# Patient Record
Sex: MALE
[De-identification: names, ages, dates, MRNs, and addresses within clinical notes are randomized per-mention and may not be internally consistent; named-entity substitution may affect disease eponyms.]

---

## 2018-07-12 ENCOUNTER — IMPORTED ENCOUNTER (OUTPATIENT)
Age: 73
End: 2018-07-12

## 2020-07-25 ENCOUNTER — TELEPHONE ENCOUNTER (OUTPATIENT)
Dept: URBAN - METROPOLITAN AREA CLINIC 13 | Facility: CLINIC | Age: 75
End: 2020-07-25

## 2020-07-25 RX ORDER — POLYETHYLENE GLYCOL 3350, SODIUM SULFATE, SODIUM CHLORIDE, POTASSIUM CHLORIDE, ASCORBIC ACID, SODIUM ASCORBATE 7.5-2.691G
USE AS DIRECTED KIT ORAL
Qty: 1 | Refills: 0 | OUTPATIENT
Start: 2013-02-06 | End: 2013-03-28

## 2020-07-25 RX ORDER — CETIRIZINE HYDROCHLORIDE 10 MG/1
TAKE 1 TABLET DAILY.  10 MG TABLET, FILM COATED ORAL
Refills: 0 | OUTPATIENT
End: 2016-01-22

## 2020-07-25 RX ORDER — GABAPENTIN 300 MG/1
TAKE 1 CAPSULE DAILY CAPSULE ORAL
Refills: 0 | OUTPATIENT
End: 2016-04-14

## 2020-07-26 ENCOUNTER — TELEPHONE ENCOUNTER (OUTPATIENT)
Dept: URBAN - METROPOLITAN AREA CLINIC 13 | Facility: CLINIC | Age: 75
End: 2020-07-26

## 2020-07-26 RX ORDER — KETOCONAZOLE 20 MG/G
CREAM TOPICAL
Qty: 30 | Refills: 0 | Status: ACTIVE | COMMUNITY
Start: 2012-04-17

## 2020-07-26 RX ORDER — MELOXICAM 7.5 MG/1
TABLET ORAL
Qty: 50 | Refills: 0 | Status: ACTIVE | COMMUNITY
Start: 2012-11-01

## 2020-07-26 RX ORDER — GLIPIZIDE 5 MG/1
TABLET, EXTENDED RELEASE ORAL
Qty: 90 | Refills: 0 | Status: ACTIVE | COMMUNITY
Start: 2012-04-10

## 2020-07-26 RX ORDER — INSULIN GLARGINE 100 [IU]/ML
INJECT SUBCUTANEOUSLY AS DIRECTED. 24 UNITS INJECTION, SOLUTION SUBCUTANEOUS
Refills: 0 | Status: ACTIVE | COMMUNITY

## 2020-07-26 RX ORDER — PREDNISONE 10 MG/1
TABLET ORAL
Qty: 24 | Refills: 0 | Status: ACTIVE | COMMUNITY
Start: 2012-04-17

## 2020-07-26 RX ORDER — HYDROCODONE BITARTRATE AND ACETAMINOPHEN 5; 325 MG/1; MG/1
TABLET ORAL
Qty: 12 | Refills: 0 | Status: ACTIVE | COMMUNITY
Start: 2012-08-23

## 2020-07-26 RX ORDER — AMOXICILLIN 500 MG/1
CAPSULE ORAL
Qty: 21 | Refills: 0 | Status: ACTIVE | COMMUNITY
Start: 2012-09-22

## 2020-07-26 RX ORDER — CEPHRADINE 250 MG
TAKE 1 CAPSULE DAILY CAPSULE ORAL
Refills: 0 | Status: ACTIVE | COMMUNITY

## 2020-07-26 RX ORDER — BLOOD SUGAR DIAGNOSTIC
STRIP MISCELLANEOUS
Qty: 100 | Refills: 0 | Status: ACTIVE | COMMUNITY
Start: 2012-11-08

## 2020-07-26 RX ORDER — METOPROLOL TARTRATE 50 MG/1
TABLET, FILM COATED ORAL
Qty: 90 | Refills: 0 | Status: ACTIVE | COMMUNITY
Start: 2011-07-19

## 2020-07-26 RX ORDER — GLIPIZIDE 5 MG/1
TAKE 1 TABLET DAILY TABLET ORAL
Refills: 0 | Status: ACTIVE | COMMUNITY

## 2020-07-26 RX ORDER — METOPROLOL SUCCINATE 25 MG/1
TAKE 1 TABLET DAILY TABLET, EXTENDED RELEASE ORAL
Refills: 0 | Status: ACTIVE | COMMUNITY

## 2020-07-26 RX ORDER — FLUTICASONE PROPIONATE 50 UG/1
SPRAY, METERED NASAL
Qty: 48 | Refills: 0 | Status: ACTIVE | COMMUNITY
Start: 2012-09-04

## 2020-07-26 RX ORDER — FLUOROURACIL 50 MG/G
CREAM TOPICAL
Qty: 40 | Refills: 0 | Status: ACTIVE | COMMUNITY
Start: 2012-10-10

## 2020-07-26 RX ORDER — UBIDECARENONE 100 MG
TAKE 1 CAPSULE DAILY CAPSULE ORAL
Refills: 0 | Status: ACTIVE | COMMUNITY

## 2020-07-26 RX ORDER — DESONIDE 0.5 MG/G
CREAM TOPICAL
Qty: 30 | Refills: 0 | Status: ACTIVE | COMMUNITY
Start: 2012-04-17

## 2020-07-26 RX ORDER — ROSUVASTATIN CALCIUM 5 MG
TAKE 1 TABLET DAILY TABLET ORAL
Refills: 0 | Status: ACTIVE | COMMUNITY

## 2020-08-10 ENCOUNTER — WEB ENCOUNTER (OUTPATIENT)
Dept: URBAN - METROPOLITAN AREA CLINIC 113 | Facility: CLINIC | Age: 75
End: 2020-08-10

## 2021-04-20 ENCOUNTER — WEB ENCOUNTER (OUTPATIENT)
Dept: URBAN - METROPOLITAN AREA CLINIC 113 | Facility: CLINIC | Age: 76
End: 2021-04-20

## 2021-04-22 ENCOUNTER — OFFICE VISIT (OUTPATIENT)
Dept: URBAN - METROPOLITAN AREA CLINIC 113 | Facility: CLINIC | Age: 76
End: 2021-04-22
Payer: MEDICARE

## 2021-04-22 ENCOUNTER — WEB ENCOUNTER (OUTPATIENT)
Dept: URBAN - METROPOLITAN AREA CLINIC 113 | Facility: CLINIC | Age: 76
End: 2021-04-22

## 2021-04-22 ENCOUNTER — LAB OUTSIDE AN ENCOUNTER (OUTPATIENT)
Dept: URBAN - METROPOLITAN AREA CLINIC 113 | Facility: CLINIC | Age: 76
End: 2021-04-22

## 2021-04-22 VITALS
DIASTOLIC BLOOD PRESSURE: 73 MMHG | HEIGHT: 65 IN | RESPIRATION RATE: 18 BRPM | WEIGHT: 153 LBS | HEART RATE: 80 BPM | BODY MASS INDEX: 25.49 KG/M2 | TEMPERATURE: 98.4 F | SYSTOLIC BLOOD PRESSURE: 130 MMHG

## 2021-04-22 DIAGNOSIS — Z86.010 HISTORY OF COLON POLYPS: ICD-10-CM

## 2021-04-22 PROCEDURE — 99204 OFFICE O/P NEW MOD 45 MIN: CPT | Performed by: INTERNAL MEDICINE

## 2021-04-22 RX ORDER — METFORMIN HYDROCHLORIDE 1000 MG/1
1 TABLET WITH A MEAL TABLET, FILM COATED ORAL TWICE DAILY
Status: ACTIVE | COMMUNITY

## 2021-04-22 RX ORDER — INSULIN GLARGINE 100 [IU]/ML
INJECT SUBCUTANEOUSLY AS DIRECTED. 24 UNITS INJECTION, SOLUTION SUBCUTANEOUS
Refills: 0 | Status: DISCONTINUED | COMMUNITY

## 2021-04-22 RX ORDER — UBIDECARENONE 100 MG
TAKE 1 CAPSULE DAILY CAPSULE ORAL
Refills: 0 | Status: ACTIVE | COMMUNITY

## 2021-04-22 RX ORDER — FLUOROURACIL 50 MG/G
CREAM TOPICAL
Qty: 40 | Refills: 0 | Status: DISCONTINUED | COMMUNITY
Start: 2012-10-10

## 2021-04-22 RX ORDER — METOPROLOL TARTRATE 25 MG/1
1 TABLET WITH FOOD TABLET, FILM COATED ORAL TWICE A DAY
Refills: 0 | Status: ACTIVE | COMMUNITY
Start: 2011-07-19

## 2021-04-22 RX ORDER — MELOXICAM 7.5 MG/1
TABLET ORAL
Qty: 50 | Refills: 0 | Status: DISCONTINUED | COMMUNITY
Start: 2012-11-01

## 2021-04-22 RX ORDER — FLUTICASONE PROPIONATE 50 UG/1
SPRAY, METERED NASAL
Qty: 48 | Refills: 0 | Status: DISCONTINUED | COMMUNITY
Start: 2012-09-04

## 2021-04-22 RX ORDER — DESONIDE 0.5 MG/G
CREAM TOPICAL
Qty: 30 | Refills: 0 | Status: DISCONTINUED | COMMUNITY
Start: 2012-04-17

## 2021-04-22 RX ORDER — FAMOTIDINE 20 MG/1
1 TABLET AT BEDTIME AS NEEDED TABLET, FILM COATED ORAL TWICE A DAY
Status: ACTIVE | COMMUNITY

## 2021-04-22 RX ORDER — PREDNISONE 10 MG/1
TABLET ORAL
Qty: 24 | Refills: 0 | Status: DISCONTINUED | COMMUNITY
Start: 2012-04-17

## 2021-04-22 RX ORDER — KETOCONAZOLE 20 MG/G
CREAM TOPICAL
Qty: 30 | Refills: 0 | Status: DISCONTINUED | COMMUNITY
Start: 2012-04-17

## 2021-04-22 RX ORDER — CEPHRADINE 250 MG
TAKE 1 CAPSULE DAILY CAPSULE ORAL
Refills: 0 | Status: DISCONTINUED | COMMUNITY

## 2021-04-22 RX ORDER — GLIPIZIDE 5 MG/1
TABLET, EXTENDED RELEASE ORAL
Qty: 90 | Refills: 0 | Status: DISCONTINUED | COMMUNITY
Start: 2012-04-10

## 2021-04-22 RX ORDER — AMOXICILLIN 500 MG/1
CAPSULE ORAL
Qty: 21 | Refills: 0 | Status: DISCONTINUED | COMMUNITY
Start: 2012-09-22

## 2021-04-22 RX ORDER — SODIUM, POTASSIUM,MAG SULFATES 17.5-3.13G
354ML SOLUTION, RECONSTITUTED, ORAL ORAL
Qty: 354 MILLILITER | Refills: 2 | OUTPATIENT
Start: 2021-04-22 | End: 2021-07-21

## 2021-04-22 RX ORDER — HYDROCODONE BITARTRATE AND ACETAMINOPHEN 5; 325 MG/1; MG/1
TABLET ORAL
Qty: 12 | Refills: 0 | Status: DISCONTINUED | COMMUNITY
Start: 2012-08-23

## 2021-04-22 RX ORDER — ROSUVASTATIN CALCIUM 10 MG/1
1 TABLET TABLET, FILM COATED ORAL ONCE A DAY
Refills: 0 | Status: ACTIVE | COMMUNITY

## 2021-04-22 RX ORDER — METOPROLOL SUCCINATE 25 MG/1
TAKE 1 TABLET DAILY TABLET, EXTENDED RELEASE ORAL
Refills: 0 | Status: DISCONTINUED | COMMUNITY

## 2021-04-22 RX ORDER — LOSARTAN POTASSIUM 25 MG/1
1 TABLET TABLET ORAL ONCE A DAY
Status: ACTIVE | COMMUNITY

## 2021-04-22 RX ORDER — INSULIN ASPART 100 [IU]/ML
AS DIRECTED INJECTION, SOLUTION INTRAVENOUS; SUBCUTANEOUS
Status: ACTIVE | COMMUNITY

## 2021-04-22 RX ORDER — INSULIN GLARGINE 100 [IU]/ML
AS DIRECTED INJECTION, SOLUTION SUBCUTANEOUS
Status: ACTIVE | COMMUNITY

## 2021-04-22 RX ORDER — GLIPIZIDE 10 MG/1
1 TABLET 30 MINUTES BEFORE BREAKFAST TABLET ORAL ONCE A DAY
Refills: 0 | Status: DISCONTINUED | COMMUNITY

## 2021-04-22 RX ORDER — BLOOD SUGAR DIAGNOSTIC
STRIP MISCELLANEOUS
Qty: 100 | Refills: 0 | Status: DISCONTINUED | COMMUNITY
Start: 2012-11-08

## 2021-04-22 NOTE — HPI-TODAY'S VISIT:
Mr. Mcdonald is a 75-year-old male with a history of type 2 diabetes mellitus, coronary artery disease status post CABG and adenomatous colon polyps who comes in the office today For follow-up.  He was last seen here on April 28, 2016 following a surveillance colonoscopy.   Colonoscopy 4/14/16 was notable for an adequate prep, diverticula found in the sigmoid and descending colon, 2 polyps removed from the splenic flexure and hepatic flexure (4-6 mm) and nonbleeding grade 1 internal hemorrhoids.  Polyps were tubular adenomas.   At  His last visit he stated that he was having a bowel movement once a day.  He denied blood per rectum, melena, nausea or vomiting.  He mentions that he often has postprandial generalized abdominal bloating discomfort and early satiety.  He denies heartburn or reflux symptoms.  No dysphagia.  He does not take NSAIDs.  He is an insulin-dependent diabetic and admits that his sugars are not well controlled. He is here today primarily to schedule another colonoscopy.  He has no GI complaints.

## 2021-04-26 ENCOUNTER — WEB ENCOUNTER (OUTPATIENT)
Dept: URBAN - METROPOLITAN AREA CLINIC 113 | Facility: CLINIC | Age: 76
End: 2021-04-26

## 2021-04-26 RX ORDER — FAMOTIDINE 20 MG/1
1 TABLET AT BEDTIME AS NEEDED TABLET, FILM COATED ORAL TWICE A DAY
Status: ACTIVE | COMMUNITY

## 2021-04-26 RX ORDER — INSULIN GLARGINE 100 [IU]/ML
AS DIRECTED INJECTION, SOLUTION SUBCUTANEOUS
Status: ACTIVE | COMMUNITY

## 2021-04-26 RX ORDER — INSULIN ASPART 100 [IU]/ML
AS DIRECTED INJECTION, SOLUTION INTRAVENOUS; SUBCUTANEOUS
Status: ACTIVE | COMMUNITY

## 2021-04-26 RX ORDER — METOPROLOL TARTRATE 25 MG/1
1 TABLET WITH FOOD TABLET, FILM COATED ORAL TWICE A DAY
Refills: 0 | Status: ACTIVE | COMMUNITY
Start: 2011-07-19

## 2021-04-26 RX ORDER — SODIUM, POTASSIUM,MAG SULFATES 17.5-3.13G
354ML SOLUTION, RECONSTITUTED, ORAL ORAL
Qty: 354 MILLILITER | Refills: 2 | Status: ACTIVE | COMMUNITY
Start: 2021-04-22 | End: 2021-07-21

## 2021-04-26 RX ORDER — METFORMIN HYDROCHLORIDE 1000 MG/1
1 TABLET WITH A MEAL TABLET, FILM COATED ORAL TWICE DAILY
Status: ACTIVE | COMMUNITY

## 2021-04-26 RX ORDER — SODIUM PICOSULFATE, MAGNESIUM OXIDE, AND ANHYDROUS CITRIC ACID 10; 3.5; 12 MG/160ML; G/160ML; G/160ML
160 ML LIQUID ORAL
Qty: 320 MILLILITER | Refills: 0 | OUTPATIENT
Start: 2021-04-27 | End: 2021-04-28

## 2021-04-26 RX ORDER — UBIDECARENONE 100 MG
TAKE 1 CAPSULE DAILY CAPSULE ORAL
Refills: 0 | Status: ACTIVE | COMMUNITY

## 2021-04-26 RX ORDER — LOSARTAN POTASSIUM 25 MG/1
1 TABLET TABLET ORAL ONCE A DAY
Status: ACTIVE | COMMUNITY

## 2021-04-26 RX ORDER — ROSUVASTATIN CALCIUM 10 MG/1
1 TABLET TABLET, FILM COATED ORAL ONCE A DAY
Refills: 0 | Status: ACTIVE | COMMUNITY

## 2021-05-05 ENCOUNTER — OFFICE VISIT (OUTPATIENT)
Dept: URBAN - METROPOLITAN AREA SURGERY CENTER 25 | Facility: SURGERY CENTER | Age: 76
End: 2021-05-05

## 2021-05-12 ENCOUNTER — TELEPHONE ENCOUNTER (OUTPATIENT)
Dept: URBAN - METROPOLITAN AREA CLINIC 113 | Facility: CLINIC | Age: 76
End: 2021-05-12

## 2021-05-12 RX ORDER — SODIUM, POTASSIUM,MAG SULFATES 17.5-3.13G
354ML SOLUTION, RECONSTITUTED, ORAL ORAL
Qty: 354 MILLILITER | Refills: 2
Start: 2021-04-22 | End: 2021-08-10

## 2021-05-24 ENCOUNTER — CLAIMS CREATED FROM THE CLAIM WINDOW (OUTPATIENT)
Dept: URBAN - METROPOLITAN AREA CLINIC 4 | Facility: CLINIC | Age: 76
End: 2021-05-24
Payer: MEDICARE

## 2021-05-24 ENCOUNTER — OFFICE VISIT (OUTPATIENT)
Dept: URBAN - METROPOLITAN AREA SURGERY CENTER 25 | Facility: SURGERY CENTER | Age: 76
End: 2021-05-24
Payer: MEDICARE

## 2021-05-24 DIAGNOSIS — D12.0 ADENOMA OF CECUM: ICD-10-CM

## 2021-05-24 DIAGNOSIS — D12.0 BENIGN NEOPLASM OF CECUM: ICD-10-CM

## 2021-05-24 DIAGNOSIS — Z86.010 H/O ADENOMATOUS POLYP OF COLON: ICD-10-CM

## 2021-05-24 DIAGNOSIS — D12.3 ADENOMA OF TRANSVERSE COLON: ICD-10-CM

## 2021-05-24 DIAGNOSIS — D12.3 BENIGN NEOPLASM OF TRANSVERSE COLON: ICD-10-CM

## 2021-05-24 PROCEDURE — G8907 PT DOC NO EVENTS ON DISCHARG: HCPCS | Performed by: INTERNAL MEDICINE

## 2021-05-24 PROCEDURE — 88305 TISSUE EXAM BY PATHOLOGIST: CPT | Performed by: PATHOLOGY

## 2021-05-24 PROCEDURE — 45385 COLONOSCOPY W/LESION REMOVAL: CPT | Performed by: INTERNAL MEDICINE

## 2021-05-24 RX ORDER — METFORMIN HYDROCHLORIDE 1000 MG/1
1 TABLET WITH A MEAL TABLET, FILM COATED ORAL TWICE DAILY
Status: ACTIVE | COMMUNITY

## 2021-05-24 RX ORDER — INSULIN ASPART 100 [IU]/ML
AS DIRECTED INJECTION, SOLUTION INTRAVENOUS; SUBCUTANEOUS
Status: ACTIVE | COMMUNITY

## 2021-05-24 RX ORDER — ROSUVASTATIN CALCIUM 10 MG/1
1 TABLET TABLET, FILM COATED ORAL ONCE A DAY
Refills: 0 | Status: ACTIVE | COMMUNITY

## 2021-05-24 RX ORDER — FAMOTIDINE 20 MG/1
1 TABLET AT BEDTIME AS NEEDED TABLET, FILM COATED ORAL TWICE A DAY
Status: ACTIVE | COMMUNITY

## 2021-05-24 RX ORDER — UBIDECARENONE 100 MG
TAKE 1 CAPSULE DAILY CAPSULE ORAL
Refills: 0 | Status: ACTIVE | COMMUNITY

## 2021-05-24 RX ORDER — LOSARTAN POTASSIUM 25 MG/1
1 TABLET TABLET ORAL ONCE A DAY
Status: ACTIVE | COMMUNITY

## 2021-05-24 RX ORDER — SODIUM, POTASSIUM,MAG SULFATES 17.5-3.13G
354ML SOLUTION, RECONSTITUTED, ORAL ORAL
Qty: 354 MILLILITER | Refills: 2 | Status: ACTIVE | COMMUNITY
Start: 2021-04-22 | End: 2021-08-10

## 2021-05-24 RX ORDER — INSULIN GLARGINE 100 [IU]/ML
AS DIRECTED INJECTION, SOLUTION SUBCUTANEOUS
Status: ACTIVE | COMMUNITY

## 2021-05-24 RX ORDER — METOPROLOL TARTRATE 25 MG/1
1 TABLET WITH FOOD TABLET, FILM COATED ORAL TWICE A DAY
Refills: 0 | Status: ACTIVE | COMMUNITY
Start: 2011-07-19

## 2021-08-02 ENCOUNTER — OFFICE VISIT (OUTPATIENT)
Dept: URBAN - METROPOLITAN AREA CLINIC 113 | Facility: CLINIC | Age: 76
End: 2021-08-02

## 2021-09-11 ENCOUNTER — WEB ENCOUNTER (OUTPATIENT)
Dept: URBAN - METROPOLITAN AREA CLINIC 113 | Facility: CLINIC | Age: 76
End: 2021-09-11

## 2021-09-13 ENCOUNTER — OFFICE VISIT (OUTPATIENT)
Dept: URBAN - METROPOLITAN AREA CLINIC 113 | Facility: CLINIC | Age: 76
End: 2021-09-13
Payer: MEDICARE

## 2021-09-13 VITALS
SYSTOLIC BLOOD PRESSURE: 145 MMHG | DIASTOLIC BLOOD PRESSURE: 72 MMHG | HEART RATE: 102 BPM | WEIGHT: 141 LBS | BODY MASS INDEX: 23.49 KG/M2 | HEIGHT: 65 IN | TEMPERATURE: 98.4 F | RESPIRATION RATE: 18 BRPM

## 2021-09-13 DIAGNOSIS — R63.4 WEIGHT LOSS: ICD-10-CM

## 2021-09-13 DIAGNOSIS — R19.7 DIARRHEA, UNSPECIFIED TYPE: ICD-10-CM

## 2021-09-13 PROCEDURE — 99214 OFFICE O/P EST MOD 30 MIN: CPT | Performed by: NURSE PRACTITIONER

## 2021-09-13 RX ORDER — INSULIN ASPART 100 [IU]/ML
AS DIRECTED INJECTION, SOLUTION INTRAVENOUS; SUBCUTANEOUS
Status: ACTIVE | COMMUNITY

## 2021-09-13 RX ORDER — INSULIN GLARGINE 100 [IU]/ML
AS DIRECTED INJECTION, SOLUTION SUBCUTANEOUS
Status: ACTIVE | COMMUNITY

## 2021-09-13 RX ORDER — LOSARTAN POTASSIUM 25 MG/1
1 TABLET TABLET ORAL ONCE A DAY
Status: DISCONTINUED | COMMUNITY

## 2021-09-13 RX ORDER — METOPROLOL TARTRATE 25 MG/1
1 TABLET WITH FOOD TABLET, FILM COATED ORAL TWICE A DAY
Refills: 0 | Status: ACTIVE | COMMUNITY
Start: 2011-07-19

## 2021-09-13 RX ORDER — TRAZODONE HYDROCHLORIDE 50 MG/1
1 TABLET AT BEDTIME AS NEEDED TABLET, FILM COATED ORAL ONCE A DAY
Status: ACTIVE | COMMUNITY

## 2021-09-13 RX ORDER — FAMOTIDINE 20 MG/1
1 TABLET AT BEDTIME AS NEEDED TABLET, FILM COATED ORAL TWICE A DAY
Status: ACTIVE | COMMUNITY

## 2021-09-13 RX ORDER — ROSUVASTATIN CALCIUM 10 MG/1
1 TABLET TABLET, FILM COATED ORAL ONCE A DAY
Refills: 0 | Status: ACTIVE | COMMUNITY

## 2021-09-13 RX ORDER — METFORMIN HYDROCHLORIDE 1000 MG/1
1 TABLET WITH A MEAL TABLET, FILM COATED ORAL TWICE DAILY
Status: ACTIVE | COMMUNITY

## 2021-09-13 RX ORDER — UBIDECARENONE 100 MG
TAKE 1 CAPSULE DAILY CAPSULE ORAL
Refills: 0 | Status: ACTIVE | COMMUNITY

## 2021-09-13 NOTE — HPI-TODAY'S VISIT:
This is a 75-year-old male with a history of type 2 diabetes, coronary artery disease status post CABG, and adenomatous colon polyps due surveillance in May 2026 presenting for an urgent evaluation of decreased appetite, frequent stools, and weight loss.  He was seen in April of this year for an office visit preceding colonoscopy.  He denied any abdominal symptoms.  He was admitted to the hospital in late August and diagnosed with acute gallstone pancreatitis.  He was informed that the ducts were clear.  He underwent cholecystectomy and improved.  He was discharged from the hospital 8/31/2021.  He was discharged on 2 antibiotics which he took for 4 days. He had constipation for a few days after discharge.  He then had loose stools and noticed pieces of food in his stool.  Now he is having a bowel movement within 20 to 30 minutes after oral intake numbering 5 to 6/day.  He denies nocturnal bowel movements, red blood or melena.  He denies associated abdominal pain or cramping.  He has heartburn that is controlled with famotidine.  He reports no energy.  He has a decrease in appetite.  He denies nausea and vomiting.  He reports a dry mouth that began when he was hospitalized.  He states this makes it difficult for him to swallow dry food and he is requiring an increased amount of liquid.  He has been using nutritional supplements and is maintaining adequate fluid intake.  He saw his primary care physician last week.  No labs were performed.  Trazodone was added because of insomnia.  He is on a stable dose of Metformin.  His blood glucose levels have been up and down but primarily into the upper 300s since discharge.  This is "worse than usual."  He had fluid in his lungs when he was in the hospital reports a chest x-ray was performed last week that showed improvement.  He has a cough when he is supine if he moves.  His weight was 149 in June and peaked in early September at 159.  He weighed 133 pounds on his scale today.  Our records reflect that he has lost 12 pounds since his last visit in April of last year.

## 2021-09-13 NOTE — HPI-OTHER HISTORIES
Colonoscopy 5/24/2021:BBPS 9, sigmoid and descending diverticulosis, removal of 3 transverse and cecal 5 to 7 mm sessile tubular adenomas, moderate grade 1 nonbleeding internal hemorrhoids.  Surveillance colonoscopy recommended in 5 years.

## 2021-09-14 ENCOUNTER — TELEPHONE ENCOUNTER (OUTPATIENT)
Dept: URBAN - METROPOLITAN AREA CLINIC 113 | Facility: CLINIC | Age: 76
End: 2021-09-14

## 2021-09-14 LAB
A/G RATIO: 1.3
ALBUMIN: 4.1
ALKALINE PHOSPHATASE: 75
ALT (SGPT): 19
AST (SGOT): 16
BASO (ABSOLUTE): 0.1
BASOS: 1
BILIRUBIN, TOTAL: 0.4
BUN/CREATININE RATIO: 20
BUN: 17
CALCIUM: 9.9
CARBON DIOXIDE, TOTAL: 25
CHLORIDE: 97
CREATININE: 0.84
EGFR IF AFRICN AM: 99
EGFR IF NONAFRICN AM: 86
EOS (ABSOLUTE): 0.1
EOS: 1
GLOBULIN, TOTAL: 3.1
GLUCOSE: 221
HEMATOCRIT: 41.6
HEMATOLOGY COMMENTS:: (no result)
HEMOGLOBIN: 14.4
IMMATURE CELLS: (no result)
IMMATURE GRANS (ABS): 0.1
IMMATURE GRANULOCYTES: 1
LYMPHS (ABSOLUTE): 2.8
LYMPHS: 20
MCH: 33
MCHC: 34.6
MCV: 95
MONOCYTES(ABSOLUTE): 1.2
MONOCYTES: 8
NEUTROPHILS (ABSOLUTE): 9.9
NEUTROPHILS: 69
NRBC: (no result)
PLATELETS: 374
POTASSIUM: 5.3
PROTEIN, TOTAL: 7.2
RBC: 4.37
RDW: 11.9
SODIUM: 138
TSH: 1.27
WBC: 14.1

## 2021-09-20 ENCOUNTER — TELEPHONE ENCOUNTER (OUTPATIENT)
Dept: URBAN - METROPOLITAN AREA CLINIC 113 | Facility: CLINIC | Age: 76
End: 2021-09-20

## 2021-09-20 LAB
C DIFFICILE TOXINS A+B, EIA: NEGATIVE
CAMPYLOBACTER CULTURE: (no result)
E COLI SHIGA TOXIN EIA: NEGATIVE
Lab: (no result)
SALMONELLA/SHIGELLA SCREEN: (no result)
WHITE BLOOD CELLS (WBC), STOOL: (no result)

## 2021-09-22 ENCOUNTER — WEB ENCOUNTER (OUTPATIENT)
Dept: URBAN - METROPOLITAN AREA CLINIC 113 | Facility: CLINIC | Age: 76
End: 2021-09-22

## 2021-09-22 ENCOUNTER — TELEPHONE ENCOUNTER (OUTPATIENT)
Dept: URBAN - METROPOLITAN AREA CLINIC 113 | Facility: CLINIC | Age: 76
End: 2021-09-22

## 2021-09-22 LAB
AMYLASE: 75
C-REACTIVE PROTEIN, QUANT: 6
LIPASE: 104

## 2021-09-23 ENCOUNTER — LAB OUTSIDE AN ENCOUNTER (OUTPATIENT)
Dept: URBAN - METROPOLITAN AREA CLINIC 113 | Facility: CLINIC | Age: 76
End: 2021-09-23

## 2021-09-24 ENCOUNTER — WEB ENCOUNTER (OUTPATIENT)
Dept: URBAN - METROPOLITAN AREA CLINIC 113 | Facility: CLINIC | Age: 76
End: 2021-09-24

## 2021-09-24 LAB — CA 19-9: 15

## 2021-09-29 PROBLEM — 428283002: Status: ACTIVE | Noted: 2021-04-22

## 2021-09-29 PROBLEM — 111583006: Status: ACTIVE | Noted: 2021-09-14

## 2021-10-28 ENCOUNTER — OFFICE VISIT (OUTPATIENT)
Dept: URBAN - METROPOLITAN AREA CLINIC 113 | Facility: CLINIC | Age: 76
End: 2021-10-28
Payer: MEDICARE

## 2021-10-28 VITALS
SYSTOLIC BLOOD PRESSURE: 133 MMHG | WEIGHT: 147 LBS | BODY MASS INDEX: 24.49 KG/M2 | RESPIRATION RATE: 20 BRPM | HEIGHT: 65 IN | DIASTOLIC BLOOD PRESSURE: 72 MMHG | TEMPERATURE: 97.8 F | HEART RATE: 80 BPM

## 2021-10-28 DIAGNOSIS — R68.81 EARLY SATIETY: ICD-10-CM

## 2021-10-28 DIAGNOSIS — K85.80 OTHER ACUTE PANCREATITIS WITHOUT INFECTION OR NECROSIS: ICD-10-CM

## 2021-10-28 DIAGNOSIS — K21.9 GASTROESOPHAGEAL REFLUX DISEASE, UNSPECIFIED WHETHER ESOPHAGITIS PRESENT: ICD-10-CM

## 2021-10-28 DIAGNOSIS — R19.7 DIARRHEA, UNSPECIFIED TYPE: ICD-10-CM

## 2021-10-28 PROCEDURE — 99213 OFFICE O/P EST LOW 20 MIN: CPT | Performed by: NURSE PRACTITIONER

## 2021-10-28 RX ORDER — ROSUVASTATIN CALCIUM 10 MG/1
1 TABLET TABLET, FILM COATED ORAL ONCE A DAY
Refills: 0 | Status: ACTIVE | COMMUNITY

## 2021-10-28 RX ORDER — INSULIN ASPART 100 [IU]/ML
AS DIRECTED INJECTION, SOLUTION INTRAVENOUS; SUBCUTANEOUS
Status: ACTIVE | COMMUNITY

## 2021-10-28 RX ORDER — FAMOTIDINE 20 MG/1
1 TABLET TABLET, FILM COATED ORAL TWICE A DAY
Status: ACTIVE | COMMUNITY

## 2021-10-28 RX ORDER — TRAZODONE HYDROCHLORIDE 50 MG/1
1 TABLET AT BEDTIME AS NEEDED TABLET, FILM COATED ORAL ONCE A DAY
Status: ACTIVE | COMMUNITY

## 2021-10-28 RX ORDER — INSULIN GLARGINE 100 [IU]/ML
AS DIRECTED INJECTION, SOLUTION SUBCUTANEOUS
Status: ACTIVE | COMMUNITY

## 2021-10-28 RX ORDER — METOPROLOL TARTRATE 25 MG/1
1 TABLET WITH FOOD TABLET, FILM COATED ORAL TWICE A DAY
Refills: 0 | Status: ACTIVE | COMMUNITY
Start: 2011-07-19

## 2021-10-28 RX ORDER — METFORMIN HYDROCHLORIDE 1000 MG/1
1 TABLET WITH A MEAL TABLET, FILM COATED ORAL TWICE DAILY
Status: ACTIVE | COMMUNITY

## 2021-10-28 RX ORDER — UBIDECARENONE 100 MG
TAKE 1 CAPSULE DAILY CAPSULE ORAL
Refills: 0 | Status: ACTIVE | COMMUNITY

## 2021-10-28 NOTE — HPI-OTHER HISTORIES
Labs on 9/20/2021 demonstrated a normal CA 19-9, amylase, and CRP.  Lipase was mildly elevated at 104.

## 2021-10-28 NOTE — HPI-TODAY'S VISIT:
This is a 76-year-old male with a history of type 2 diabetes, coronary artery disease status post CABG, adenomatous colon polyps due surveillance in May 2026, decreased appetite, frequent stools, and weight loss presenting for follow-up. He was initially seen 9/13/2021 for urgent evaluation of decreased appetite, frequent stools, and weight loss.  He had been admitted to the hospital in late August with acute gallstone pancreatitis.  Ducts were clear.  He underwent cholecystectomy and improved.  He was discharged from the hospital on 2 antibiotics which he took for 4 days.  He had constipation immediately after discharge and then began having 5-6 loose stools per day.  He denies abdominal pain.  Constipation was controlled with famotidine.  He was on a stable dose of Metformin.  Trazodone had recently been added to treat insomnia.  He reported blood glucose levels that were variable and typically in the upper 300s since discharge.  He had lost weight as well.  He admitted a loss of appetite.  Hospital records were requested.  He was instructed to add daily fiber.  Labs were ordered as well as stool studies.  He was to continue nutritional supplements.  Labs showed a slightly elevated potassium and WBC of 14.  Dr. Galo reviewed his case and recommended a CT scan.  Stool studies were negative.  CT scan demonstrated pancreatic inflammation.  Additional labs were performed notable for mild elevation of pancreatic enzymes that were not clinically significant.  Inflammatory marker was normal.  He reported via phone call on 9/22 that his stools were normal consistency on Benefiber.  He described early satiety and denied abdominal pain or nausea.  He was continuing to lose weight.  Dr. Galo recommended further assessment of his pancreas with tumor marker and an EUS.  He also recommended a trial of pancreatic enzymes.  He was provided samples of Zenpep (unknown strength) and instructed to take 1 with a meal and with a healthy snack 4 times a day.   He is scheduled for an EUS 11/24/2021. He is taking  Zenpep with every meal and is taking daily fiber.  Diarrhea has resolved.  He is having regular bowel movements.  He denies abdominal pain or nausea.  He reports his appetite has improved.  He denies heartburn, dysphagia, red blood per rectum, or melena.  He is observing a low-fat diet.  He continues to report elevated blood glucose levels.  Oral diabetes medication was held and he has been on insulin only due to concern that his medication may be contributing to diarrhea.  He continues to experience lack of energy and fatigue.  Our records reflect that he has gained 6 pounds since his last visit.  His wife reports that his weight fluctuates from day-to-day sometimes by 10 pounds.

## 2021-11-10 ENCOUNTER — OFFICE VISIT (OUTPATIENT)
Dept: URBAN - METROPOLITAN AREA MEDICAL CENTER 19 | Facility: MEDICAL CENTER | Age: 76
End: 2021-11-10

## 2021-11-12 ASSESSMENT — KERATOMETRY
OS_AXISANGLE_DEGREES: 175
OS_K2POWER_DIOPTERS: 43.5
OS_AXISANGLE2_DEGREES: 85
OD_AXISANGLE_DEGREES: 170
OD_K1POWER_DIOPTERS: 42.7
OD_AXISANGLE2_DEGREES: 80
OS_K1POWER_DIOPTERS: 42.25
OD_K2POWER_DIOPTERS: 43.5

## 2021-11-12 ASSESSMENT — TONOMETRY
OD_IOP_MMHG: 16
OS_IOP_MMHG: 14

## 2021-11-12 ASSESSMENT — VISUAL ACUITY
OS_CC: 20/20 SN
OD_SC: 20/25 SN
OD_CC: 20/20 SN
OS_SC: 20/20 SN

## 2021-11-24 ENCOUNTER — OFFICE VISIT (OUTPATIENT)
Dept: URBAN - METROPOLITAN AREA MEDICAL CENTER 19 | Facility: MEDICAL CENTER | Age: 76
End: 2021-11-24
Payer: MEDICARE

## 2021-11-24 DIAGNOSIS — K85.80 AUTOIMMUNE PANCREATITIS: ICD-10-CM

## 2021-11-24 DIAGNOSIS — K86.1 CHRONIC PANCREATITIS: ICD-10-CM

## 2021-11-24 DIAGNOSIS — R93.5 ABDOMINAL ULTRASOUND, ABNORMAL: ICD-10-CM

## 2021-11-24 DIAGNOSIS — K22.89 ESOPHAGEAL BLEEDING: ICD-10-CM

## 2021-11-24 PROCEDURE — 43239 EGD BIOPSY SINGLE/MULTIPLE: CPT | Performed by: INTERNAL MEDICINE

## 2021-11-24 PROCEDURE — 43259 EGD US EXAM DUODENUM/JEJUNUM: CPT | Performed by: INTERNAL MEDICINE

## 2021-11-30 ENCOUNTER — TELEPHONE ENCOUNTER (OUTPATIENT)
Dept: URBAN - METROPOLITAN AREA CLINIC 113 | Facility: CLINIC | Age: 76
End: 2021-11-30

## 2021-12-08 ENCOUNTER — TELEPHONE ENCOUNTER (OUTPATIENT)
Dept: URBAN - METROPOLITAN AREA CLINIC 113 | Facility: CLINIC | Age: 76
End: 2021-12-08

## 2022-02-14 ENCOUNTER — OFFICE VISIT (OUTPATIENT)
Dept: URBAN - METROPOLITAN AREA CLINIC 113 | Facility: CLINIC | Age: 77
End: 2022-02-14
Payer: MEDICARE

## 2022-02-14 VITALS
WEIGHT: 153 LBS | HEART RATE: 76 BPM | DIASTOLIC BLOOD PRESSURE: 64 MMHG | RESPIRATION RATE: 18 BRPM | BODY MASS INDEX: 25.49 KG/M2 | TEMPERATURE: 97.6 F | HEIGHT: 65 IN | SYSTOLIC BLOOD PRESSURE: 115 MMHG

## 2022-02-14 DIAGNOSIS — R63.4 WEIGHT LOSS: ICD-10-CM

## 2022-02-14 DIAGNOSIS — K21.9 GASTROESOPHAGEAL REFLUX DISEASE, UNSPECIFIED WHETHER ESOPHAGITIS PRESENT: ICD-10-CM

## 2022-02-14 DIAGNOSIS — K85.80 OTHER ACUTE PANCREATITIS WITHOUT INFECTION OR NECROSIS: ICD-10-CM

## 2022-02-14 DIAGNOSIS — R14.0 ABDOMINAL DISTENTION: ICD-10-CM

## 2022-02-14 DIAGNOSIS — R19.7 DIARRHEA, UNSPECIFIED TYPE: ICD-10-CM

## 2022-02-14 DIAGNOSIS — R68.81 EARLY SATIETY: ICD-10-CM

## 2022-02-14 PROCEDURE — 99214 OFFICE O/P EST MOD 30 MIN: CPT | Performed by: INTERNAL MEDICINE

## 2022-02-14 RX ORDER — METOPROLOL TARTRATE 25 MG/1
1 TABLET WITH FOOD TABLET, FILM COATED ORAL ONCE DAILY
Refills: 0 | Status: ACTIVE | COMMUNITY
Start: 2011-07-19

## 2022-02-14 RX ORDER — TRAZODONE HYDROCHLORIDE 50 MG/1
1 TABLET AT BEDTIME AS NEEDED TABLET, FILM COATED ORAL ONCE A DAY
Status: DISCONTINUED | COMMUNITY

## 2022-02-14 RX ORDER — ROSUVASTATIN CALCIUM 10 MG/1
1 TABLET TABLET, FILM COATED ORAL ONCE A DAY
Refills: 0 | Status: ACTIVE | COMMUNITY

## 2022-02-14 RX ORDER — INSULIN GLARGINE 100 [IU]/ML
AS DIRECTED INJECTION, SOLUTION SUBCUTANEOUS
Status: ACTIVE | COMMUNITY

## 2022-02-14 RX ORDER — INSULIN ASPART 100 [IU]/ML
AS DIRECTED INJECTION, SOLUTION INTRAVENOUS; SUBCUTANEOUS
Status: ACTIVE | COMMUNITY

## 2022-02-14 RX ORDER — METFORMIN HYDROCHLORIDE 1000 MG/1
1 TABLET WITH A MEAL TABLET, FILM COATED ORAL TWICE DAILY
Status: ACTIVE | COMMUNITY

## 2022-02-14 RX ORDER — FAMOTIDINE 20 MG/1
1 TABLET TABLET, FILM COATED ORAL TWICE A DAY
Status: ACTIVE | COMMUNITY

## 2022-02-14 RX ORDER — CHOLECALCIFEROL (VITAMIN D3) 50 MCG
1 TABLET TABLET ORAL ONCE A DAY
Status: ACTIVE | COMMUNITY

## 2022-02-14 RX ORDER — LOSARTAN POTASSIUM 25 MG/1
1 TABLET TABLET ORAL ONCE A DAY
Status: ACTIVE | COMMUNITY

## 2022-02-14 RX ORDER — UBIDECARENONE 100 MG
TAKE 1 CAPSULE DAILY CAPSULE ORAL
Refills: 0 | Status: ACTIVE | COMMUNITY

## 2022-02-14 NOTE — PHYSICAL EXAM GASTROINTESTINAL
Abdomen , soft, nontender, softly distended , no guarding or rigidity , no masses palpable , normal bowel sounds , ? fluid wave Liver and Spleen , no hepatosplenomegaly

## 2022-02-14 NOTE — HPI-TODAY'S VISIT:
This is a 76-year-old male with a history of type 2 diabetes, coronary artery disease status post CABG, adenomatous colon polyps due surveillance in May 2026, decreased appetite, frequent stools, and weight loss presenting for follow-up. He was last seen in our office on October 28, 2021.  He was initially seen 9/13/2021 for urgent evaluation of decreased appetite, frequent stools, and weight loss.  He had been admitted to the hospital in late August with acute gallstone pancreatitis.  Ducts were clear.  He underwent cholecystectomy and improved.  He was discharged from the hospital on 2 antibiotics which he took for 4 days.  He had constipation immediately after discharge and then began having 5-6 loose stools per day.  Bowel habits eventually returned to normal.  Labs showed a slightly elevated potassium and WBC of 14.   Stool studies were negative.  CT scan demonstrated pancreatic inflammation.  Additional labs were performed notable for mild elevation of pancreatic enzymes that were not clinically significant.  Inflammatory markers were normal.   He reported via phone call on 9/22 that his stools were normal consistency on Benefiber.  He described early satiety and denied abdominal pain or nausea.  He was continuing to lose weight and was started on pancreatic enzyme supplements.  He was also scheduled for EUS.   At the time of his last office visit, he was taking fiber every day and Zenpep before every meal, and his diarrhea had resolved.  He denied abdominal pain or nausea, and reported that his appetite had improved.  He also denied heartburn, dysphagia, red blood per rectum, or melena.  He was observing a low-fat diet.  He continues to report elevated blood glucose levels.  He had gained 6 pounds since his prior visit.    The patient had an endoscopic ultrasound performed by Dr. Posadas in 11/2021 which showed evidence of chronic pancreatitis, but no other abnormalities.  No masses were seen.  The patient has a main complaint of being "bloated" all the time, with increased gas after eating.  He denies any abdomen abdominal pain and is no longer having loose stools.  He is having 2-3 formed stools per day and is now off of pancreatic enzyme supplements.  He has gained weight recently.  He states the bloating has been a problem for 3 or 4 weeks.  He denies any alcohol intake.

## 2022-02-17 PROBLEM — 442076002: Status: ACTIVE | Noted: 2021-09-23

## 2022-02-17 PROBLEM — 62315008: Status: ACTIVE | Noted: 2021-09-13

## 2022-02-17 PROBLEM — 235595009: Status: ACTIVE | Noted: 2021-11-03

## 2022-02-17 PROBLEM — 89362005: Status: ACTIVE | Noted: 2021-09-13

## 2022-02-17 PROBLEM — 197456007: Status: ACTIVE | Noted: 2021-09-20

## 2022-03-01 ENCOUNTER — TELEPHONE ENCOUNTER (OUTPATIENT)
Dept: URBAN - METROPOLITAN AREA CLINIC 113 | Facility: CLINIC | Age: 77
End: 2022-03-01

## 2022-03-14 ENCOUNTER — TELEPHONE ENCOUNTER (OUTPATIENT)
Dept: URBAN - METROPOLITAN AREA CLINIC 113 | Facility: CLINIC | Age: 77
End: 2022-03-14

## 2024-02-02 ENCOUNTER — OV NP (OUTPATIENT)
Dept: URBAN - METROPOLITAN AREA CLINIC 113 | Facility: CLINIC | Age: 79
End: 2024-02-02

## 2024-02-19 ENCOUNTER — OV NP (OUTPATIENT)
Dept: URBAN - METROPOLITAN AREA CLINIC 113 | Facility: CLINIC | Age: 79
End: 2024-02-19
Payer: MEDICARE

## 2024-02-19 VITALS
HEART RATE: 69 BPM | SYSTOLIC BLOOD PRESSURE: 141 MMHG | WEIGHT: 156.8 LBS | BODY MASS INDEX: 26.12 KG/M2 | TEMPERATURE: 97.3 F | DIASTOLIC BLOOD PRESSURE: 90 MMHG | HEIGHT: 65 IN | RESPIRATION RATE: 18 BRPM

## 2024-02-19 DIAGNOSIS — R05.3 CHRONIC COUGH: ICD-10-CM

## 2024-02-19 DIAGNOSIS — K21.9 GASTROESOPHAGEAL REFLUX DISEASE WITHOUT ESOPHAGITIS: ICD-10-CM

## 2024-02-19 PROBLEM — 68154008: Status: ACTIVE | Noted: 2024-02-19

## 2024-02-19 PROBLEM — 266435005: Status: ACTIVE | Noted: 2024-02-19

## 2024-02-19 PROCEDURE — 99214 OFFICE O/P EST MOD 30 MIN: CPT | Performed by: NURSE PRACTITIONER

## 2024-02-19 RX ORDER — UBIDECARENONE 100 MG
TAKE 1 CAPSULE DAILY CAPSULE ORAL
Refills: 0 | Status: ACTIVE | COMMUNITY

## 2024-02-19 RX ORDER — OMEPRAZOLE 40 MG/1
1 CAPSULE 30 MINUTES BEFORE EVENING MEAL CAPSULE, DELAYED RELEASE ORAL ONCE A DAY
Qty: 30 | Refills: 5 | OUTPATIENT
Start: 2024-02-19

## 2024-02-19 RX ORDER — METOPROLOL TARTRATE 25 MG/1
1 TABLET WITH FOOD TABLET, FILM COATED ORAL ONCE DAILY
Refills: 0 | Status: ON HOLD | COMMUNITY
Start: 2011-07-19

## 2024-02-19 RX ORDER — TRAVOPROST OPHTHALMIC SOLUTION, 0.004% 0.04 MG/ML
1 DROP INTO AFFECTED EYE IN THE EVENING SOLUTION/ DROPS OPHTHALMIC ONCE A DAY
Status: ACTIVE | COMMUNITY

## 2024-02-19 RX ORDER — ROSUVASTATIN CALCIUM 10 MG/1
1 TABLET TABLET, FILM COATED ORAL ONCE A DAY
Refills: 0 | Status: ACTIVE | COMMUNITY

## 2024-02-19 RX ORDER — CHOLECALCIFEROL (VITAMIN D3) 50 MCG
1 TABLET TABLET ORAL ONCE A DAY
Status: ON HOLD | COMMUNITY

## 2024-02-19 RX ORDER — FAMOTIDINE 40 MG/1
1 TABLET AT BEDTIME TABLET, FILM COATED ORAL ONCE A DAY
Qty: 30 | Refills: 5 | OUTPATIENT
Start: 2024-02-19

## 2024-02-19 RX ORDER — LOSARTAN POTASSIUM 25 MG/1
1 TABLET TABLET ORAL ONCE A DAY
Status: ACTIVE | COMMUNITY

## 2024-02-19 RX ORDER — INSULIN GLARGINE 100 [IU]/ML
AS DIRECTED INJECTION, SOLUTION SUBCUTANEOUS
Status: ACTIVE | COMMUNITY

## 2024-02-19 RX ORDER — METFORMIN HYDROCHLORIDE 1000 MG/1
1 TABLET WITH A MEAL TABLET, FILM COATED ORAL TWICE DAILY
Status: ON HOLD | COMMUNITY

## 2024-02-19 RX ORDER — FAMOTIDINE 20 MG/1
1 TABLET TABLET, FILM COATED ORAL TWICE A DAY
Status: ACTIVE | COMMUNITY

## 2024-02-19 RX ORDER — INSULIN ASPART 100 [IU]/ML
AS DIRECTED INJECTION, SOLUTION INTRAVENOUS; SUBCUTANEOUS
Status: ACTIVE | COMMUNITY

## 2024-02-19 NOTE — HPI-TODAY'S VISIT:
This is a 78-year-old male with a history of diabetes, coronary artery disease status post CABG, adenomatous colon polyps due surveillance in May 2026, acute pancreatitis, GERD, and weight loss presenting for long interval follow-up for gastroesophageal reflux.  He was last seen in the office 2/14/2022 for follow-up regarding acute pancreatitis.  He had improved.  He had undergone a recent EUS notable for changes of chronic pancreatitis but no other abnormalities.  GERD was controlled with famotidine 20 mg twice a day.  He reported a new symptom of bloating.  He had previously experienced weight loss.  He was scheduled for a CT.  CT scan was not performed as it was not covered by insurance.  He has been taking famotidine 20 mg 2 in the evening.  He is coughing for 10 minutes after he gets in bed and has occasional regurgitation at night.  He is occasionally up at night because of regurgitation.  Heartburn is minimal.  He is avoiding late meals and avoiding spicy food and alcohol.  He is sleeping with his head of bed elevated and symptoms are persistent.  He tried taking Prilosec over-the-counter daily for 2 weeks.  He is not sure whether or not symptoms improved when he was on this medication.  He denies abdominal pain, dysphagia, or nausea.  He is having occasional frequent bowel movements.  He denies red blood per rectum or melena.  He takes low-dose aspirin daily and denies other NSAIDs.  He frequently ingest fruits and vegetables. He had carpal tunnel surgery in mid July 2023.  He was given mild sedation per his report.  He experienced difficulty waking and reports residual symptoms of fatigue and low energy.  He has an MRI of the head pending and there is consideration for starting donazepil.  He is reluctant to proceed with sedated examinations because of prior experience.

## 2024-02-19 NOTE — HPI-OTHER HISTORIES
Colonoscopy 5/24/2021:BBPS 9, sigmoid and descending diverticulosis, removal of 3 transverse and cecal 5 to 7 mm sessile tubular adenomas, moderate grade 1 nonbleeding internal hemorrhoids.  Surveillance colonoscopy recommended in 5 years.
He was initially seen 9/13/2021 for urgent evaluation of decreased appetite, frequent stools, and weight loss. He had been admitted to the hospital in late August with acute gallstone pancreatitis. Ducts were clear. He underwent cholecystectomy and improved. He was discharged from the hospital on 2 antibiotics which he took for 4 days. He had constipation immediately after discharge and then began having 5-6 loose stools per day. Bowel habits eventually returned to normal.  Labs showed a slightly elevated potassium and WBC of 14. Stool studies were negative. CT scan demonstrated pancreatic inflammation. Additional labs were performed notable for mild elevation of pancreatic enzymes that were not clinically significant. Inflammatory markers were normal.  He reported via phone call on 9/22 that his stools were normal consistency on Benefiber. He described early satiety and denied abdominal pain or nausea. He was continuing to lose weight and was started on pancreatic enzyme supplements. He was also scheduled for EUS.  At the time of his last office visit, he was taking fiber every day and Zenpep before every meal, and his diarrhea had resolved. He denied abdominal pain or nausea, and reported that his appetite had improved. He also denied heartburn, dysphagia, red blood per rectum, or melena. He was observing a low-fat diet. He continues to report elevated blood glucose levels. He had gained 6 pounds since his prior visit.  The patient had an endoscopic ultrasound performed by Dr. Posadas in 11/2021 which showed evidence of chronic pancreatitis, but no other abnormalities. No masses were seen. The patient has a main complaint of being "bloated" all the time, with increased gas after eating. He denies any abdomen abdominal pain and is no longer having loose stools. He is having 2-3 formed stools per day and is now off of pancreatic enzyme supplements. He has gained weight recently. He states the bloating has been a problem for 3 or 4 weeks. He denies any alcohol intake.  Labs on 9/20/2021 demonstrated a normal CA 19-9, amylase, and CRP.  Lipase was mildly elevated at 104.
n/a

## 2024-03-05 ENCOUNTER — LAB (OUTPATIENT)
Dept: URBAN - METROPOLITAN AREA CLINIC 113 | Facility: CLINIC | Age: 79
End: 2024-03-05

## 2024-04-18 ENCOUNTER — OV HOSPF/U (OUTPATIENT)
Dept: URBAN - METROPOLITAN AREA CLINIC 113 | Facility: CLINIC | Age: 79
End: 2024-04-18
Payer: MEDICARE

## 2024-04-18 VITALS
HEART RATE: 68 BPM | HEIGHT: 65 IN | DIASTOLIC BLOOD PRESSURE: 59 MMHG | SYSTOLIC BLOOD PRESSURE: 120 MMHG | RESPIRATION RATE: 20 BRPM | WEIGHT: 152.5 LBS | BODY MASS INDEX: 25.41 KG/M2 | TEMPERATURE: 97.7 F

## 2024-04-18 DIAGNOSIS — Z86.010 HISTORY OF COLON POLYPS: ICD-10-CM

## 2024-04-18 DIAGNOSIS — K21.9 GASTROESOPHAGEAL REFLUX DISEASE WITHOUT ESOPHAGITIS: ICD-10-CM

## 2024-04-18 DIAGNOSIS — K85.80 OTHER ACUTE PANCREATITIS WITHOUT INFECTION OR NECROSIS: ICD-10-CM

## 2024-04-18 PROCEDURE — 99213 OFFICE O/P EST LOW 20 MIN: CPT | Performed by: INTERNAL MEDICINE

## 2024-04-18 RX ORDER — UBIDECARENONE 100 MG
TAKE 1 CAPSULE DAILY CAPSULE ORAL
Refills: 0 | Status: ACTIVE | COMMUNITY

## 2024-04-18 RX ORDER — PANTOPRAZOLE SODIUM 40 MG/1
1 TABLET TABLET, DELAYED RELEASE ORAL ONCE A DAY
Status: ACTIVE | COMMUNITY

## 2024-04-18 RX ORDER — INSULIN GLARGINE 100 [IU]/ML
AS DIRECTED INJECTION, SOLUTION SUBCUTANEOUS
Status: ACTIVE | COMMUNITY

## 2024-04-18 RX ORDER — DONEPEZIL HYDROCHLORIDE 10 MG/1
1 TABLET AT BEDTIME TABLET, FILM COATED ORAL ONCE A DAY
Status: ACTIVE | COMMUNITY

## 2024-04-18 RX ORDER — CHOLECALCIFEROL (VITAMIN D3) 50 MCG
1 TABLET TABLET ORAL ONCE A DAY
Status: ON HOLD | COMMUNITY

## 2024-04-18 RX ORDER — FAMOTIDINE 40 MG/1
1 TABLET AT BEDTIME TABLET, FILM COATED ORAL ONCE A DAY
Qty: 30 | Refills: 5 | Status: ON HOLD | COMMUNITY
Start: 2024-02-19

## 2024-04-18 RX ORDER — TRAVOPROST OPHTHALMIC SOLUTION, 0.004% 0.04 MG/ML
1 DROP INTO AFFECTED EYE IN THE EVENING SOLUTION/ DROPS OPHTHALMIC ONCE A DAY
Status: ACTIVE | COMMUNITY

## 2024-04-18 RX ORDER — OMEPRAZOLE 40 MG/1
1 CAPSULE 30 MINUTES BEFORE EVENING MEAL CAPSULE, DELAYED RELEASE ORAL ONCE A DAY
Qty: 30 | Refills: 5 | Status: ON HOLD | COMMUNITY
Start: 2024-02-19

## 2024-04-18 RX ORDER — METOPROLOL TARTRATE 25 MG/1
1 TABLET WITH FOOD TABLET, FILM COATED ORAL ONCE DAILY
Refills: 0 | Status: ON HOLD | COMMUNITY
Start: 2011-07-19

## 2024-04-18 RX ORDER — FAMOTIDINE 20 MG/1
1 TABLET TABLET, FILM COATED ORAL TWICE A DAY
Status: ON HOLD | COMMUNITY

## 2024-04-18 RX ORDER — LOSARTAN POTASSIUM 25 MG/1
1 TABLET TABLET ORAL ONCE A DAY
Status: ACTIVE | COMMUNITY

## 2024-04-18 RX ORDER — ROSUVASTATIN CALCIUM 10 MG/1
1 TABLET TABLET, FILM COATED ORAL ONCE A DAY
Refills: 0 | Status: ACTIVE | COMMUNITY

## 2024-04-18 RX ORDER — PANTOPRAZOLE SODIUM 40 MG/1
1 TABLET TABLET, DELAYED RELEASE ORAL ONCE A DAY
Qty: 90 TABLET | Refills: 3 | OUTPATIENT
Start: 2024-04-18

## 2024-04-18 RX ORDER — INSULIN ASPART 100 [IU]/ML
AS DIRECTED INJECTION, SOLUTION INTRAVENOUS; SUBCUTANEOUS
Status: ACTIVE | COMMUNITY

## 2024-04-18 RX ORDER — METFORMIN HYDROCHLORIDE 1000 MG/1
1 TABLET WITH A MEAL TABLET, FILM COATED ORAL TWICE DAILY
Status: ON HOLD | COMMUNITY

## 2024-04-18 NOTE — HPI-TODAY'S VISIT:
This is a 78-year-old male with a history of diabetes, coronary artery disease status post CABG, adenomatous colon polyps due surveillance in May 2026, acute pancreatitis, GERD, and weight loss presenting for hospital follow-up after an episode of pancreatitis. He was last seen in the office 2/19/2024 with intermittent nocturnal regurgitation and cough possibly associated with acid reflux.  Lifestyle modification was discussed.  He was to continue famotidine at bedtime and try omeprazole in the evening.  EGD with [facility] pH testing was a future consideration. He presented to [facility] emergency department 2/28/2024 complaining of abdominal pain and nausea and vomiting.  Labs were notable for moderate transaminitis, leukocytosis, and lipase of 61,000.  CT scan showed pancreatic inflammatory stranding and edema consistent with acute pancreatitis.  There was discussion that transaminitis may have been a result of a passed gallstone.  There is no ductal dilation to suggest biliary etiology.  LFTs and significantly improved.  He was discharged 3/5/2024.  Triglycerides and IgG4 were not obtained during admission.  He has been doing well since hospital discharge.  He denies abdominal pain or nausea.  He is eating without incident.  He was informed by the hospitalist at [facility] that omeprazole may cause pancreatitis.  This has been changed to pantoprazole.  He is requesting refills.  He is having soft bowel movements.  He reports excessive gas and bloating.  He admits eating more vegetables.  Dairy products are limited.  He is taking Benefiber 2 teaspoons daily and will hold it if his stools are loose.  He denies any other abdominal symptoms.

## 2024-04-18 NOTE — HPI-OTHER HISTORIES
Labs  3/14/2024: CBC: WBC 7.9, hemoglobin 14.2, MCV 97.7, platelet 331. Amylase 91. BMP normal with exception of glucose 313. LFTs normal TB 0.4, ALP 76, ALT 28, AST 19. Magnesium 1.9. Phosphorus 3.3. Lipase 74. Hemoglobin A1c 9.4.  3/5/2024: CBC: WBC 16.4, hemoglobin 11.8, MCV 96, platelet 146.3/3/2024 BMP normal with exception of glucose 156. LFTs: TB 1.6, ALP 66, ALT 71, AST 37.